# Patient Record
Sex: MALE | Race: BLACK OR AFRICAN AMERICAN | NOT HISPANIC OR LATINO
[De-identification: names, ages, dates, MRNs, and addresses within clinical notes are randomized per-mention and may not be internally consistent; named-entity substitution may affect disease eponyms.]

---

## 2017-03-02 ENCOUNTER — RESULT REVIEW (OUTPATIENT)
Age: 82
End: 2017-03-02

## 2017-03-09 ENCOUNTER — OUTPATIENT (OUTPATIENT)
Dept: OUTPATIENT SERVICES | Facility: HOSPITAL | Age: 82
LOS: 1 days | Discharge: HOME | End: 2017-03-09

## 2017-06-27 DIAGNOSIS — C91.10 CHRONIC LYMPHOCYTIC LEUKEMIA OF B-CELL TYPE NOT HAVING ACHIEVED REMISSION: ICD-10-CM

## 2017-06-27 DIAGNOSIS — I10 ESSENTIAL (PRIMARY) HYPERTENSION: ICD-10-CM

## 2021-02-21 ENCOUNTER — OUTPATIENT (OUTPATIENT)
Dept: OUTPATIENT SERVICES | Facility: HOSPITAL | Age: 86
LOS: 1 days | Discharge: HOME | End: 2021-02-21

## 2021-02-21 DIAGNOSIS — Z11.59 ENCOUNTER FOR SCREENING FOR OTHER VIRAL DISEASES: ICD-10-CM

## 2021-02-24 ENCOUNTER — OUTPATIENT (OUTPATIENT)
Dept: OUTPATIENT SERVICES | Facility: HOSPITAL | Age: 86
LOS: 1 days | Discharge: HOME | End: 2021-02-24

## 2021-02-24 VITALS
RESPIRATION RATE: 17 BRPM | TEMPERATURE: 98 F | WEIGHT: 149.91 LBS | SYSTOLIC BLOOD PRESSURE: 117 MMHG | OXYGEN SATURATION: 98 % | DIASTOLIC BLOOD PRESSURE: 60 MMHG | HEART RATE: 83 BPM | HEIGHT: 73 IN

## 2021-02-24 VITALS — DIASTOLIC BLOOD PRESSURE: 65 MMHG | SYSTOLIC BLOOD PRESSURE: 122 MMHG | HEART RATE: 80 BPM

## 2021-02-24 DIAGNOSIS — Z98.890 OTHER SPECIFIED POSTPROCEDURAL STATES: Chronic | ICD-10-CM

## 2021-02-24 NOTE — ASU PATIENT PROFILE, ADULT - PSH
History of surgery  RIGHT CHEST WALL PORT PLACEMENT  History of surgery  BILATERAL KNEE REPLACEMENT

## 2021-02-24 NOTE — PACU DISCHARGE NOTE - COMMENTS
Patient may progress directly to Phase II, may bypass Phase I  /58 HR 60 RR 13 temp 36.5C, o2 sat 100% on room air

## 2021-02-24 NOTE — PRE-ANESTHESIA EVALUATION ADULT - NSANTHOSAYNRD_GEN_A_CORE
No. ROSALINDA screening performed.  STOP BANG Legend: 0-2 = LOW Risk; 3-4 = INTERMEDIATE Risk; 5-8 = HIGH Risk

## 2021-02-27 DIAGNOSIS — F03.90 UNSPECIFIED DEMENTIA, UNSPECIFIED SEVERITY, WITHOUT BEHAVIORAL DISTURBANCE, PSYCHOTIC DISTURBANCE, MOOD DISTURBANCE, AND ANXIETY: ICD-10-CM

## 2021-02-27 DIAGNOSIS — C91.10 CHRONIC LYMPHOCYTIC LEUKEMIA OF B-CELL TYPE NOT HAVING ACHIEVED REMISSION: ICD-10-CM

## 2021-02-27 DIAGNOSIS — H25.12 AGE-RELATED NUCLEAR CATARACT, LEFT EYE: ICD-10-CM

## 2021-02-27 DIAGNOSIS — I10 ESSENTIAL (PRIMARY) HYPERTENSION: ICD-10-CM

## 2021-02-28 ENCOUNTER — OUTPATIENT (OUTPATIENT)
Dept: OUTPATIENT SERVICES | Facility: HOSPITAL | Age: 86
LOS: 1 days | Discharge: HOME | End: 2021-02-28

## 2021-02-28 DIAGNOSIS — Z11.59 ENCOUNTER FOR SCREENING FOR OTHER VIRAL DISEASES: ICD-10-CM

## 2021-02-28 DIAGNOSIS — Z98.890 OTHER SPECIFIED POSTPROCEDURAL STATES: Chronic | ICD-10-CM

## 2021-02-28 PROBLEM — F03.90 UNSPECIFIED DEMENTIA WITHOUT BEHAVIORAL DISTURBANCE: Chronic | Status: ACTIVE | Noted: 2021-02-24

## 2021-02-28 PROBLEM — I10 ESSENTIAL (PRIMARY) HYPERTENSION: Chronic | Status: ACTIVE | Noted: 2021-02-24

## 2021-02-28 PROBLEM — C91.10 CHRONIC LYMPHOCYTIC LEUKEMIA OF B-CELL TYPE NOT HAVING ACHIEVED REMISSION: Chronic | Status: ACTIVE | Noted: 2021-02-24

## 2021-03-02 NOTE — ASU PATIENT PROFILE, ADULT - PSH
History of surgery  LEFT EYE CATARACT EXTRRACTION WITH LENS IMPLANT  History of surgery  RIGHT CHEST WALL PORT PLACEMENT  History of surgery  BILATERAL KNEE REPLACEMENT

## 2021-03-03 ENCOUNTER — OUTPATIENT (OUTPATIENT)
Dept: OUTPATIENT SERVICES | Facility: HOSPITAL | Age: 86
LOS: 1 days | Discharge: HOME | End: 2021-03-03

## 2021-03-03 VITALS
HEIGHT: 72 IN | WEIGHT: 149.03 LBS | SYSTOLIC BLOOD PRESSURE: 139 MMHG | DIASTOLIC BLOOD PRESSURE: 62 MMHG | TEMPERATURE: 97 F | HEART RATE: 54 BPM | OXYGEN SATURATION: 96 % | RESPIRATION RATE: 17 BRPM

## 2021-03-03 VITALS
HEART RATE: 54 BPM | SYSTOLIC BLOOD PRESSURE: 128 MMHG | DIASTOLIC BLOOD PRESSURE: 66 MMHG | OXYGEN SATURATION: 99 % | RESPIRATION RATE: 16 BRPM

## 2021-03-03 DIAGNOSIS — Z98.890 OTHER SPECIFIED POSTPROCEDURAL STATES: Chronic | ICD-10-CM

## 2021-03-03 NOTE — CHART NOTE - NSCHARTNOTEFT_GEN_A_CORE
PACU ANESTHESIA ADMISSION NOTE        ____  Intubated  TV:______       Rate: ______      FiO2: ______    __x__  Patent Airway    __x__  Full return of protective reflexes    __x__  Full recovery from anesthesia / back to baseline status    Vitals:  T(C): 36.3 (03-03-21 @ 10:38), Max: 36.3 (03-03-21 @ 09:34)  HR: 54 (03-03-21 @ 10:38) (54 - 54)  BP: 139/62 (03-03-21 @ 10:38) (139/62 - 139/62)  RR: 17 (03-03-21 @ 10:38) (17 - 17)  SpO2: 96% (03-03-21 @ 10:38) (96% - 96%)    Mental Status:  __x__ Awake   ___x__ Alert   _____ Drowsy   _____ Sedated    Nausea/Vomiting:  __x__ NO  ______Yes,   See Post - Op Orders          Pain Scale (0-10):  _0____    Treatment: ____ None    __x__ See Post - Op/PCA Orders    Post - Operative Fluids:   ____ Oral   __x__ See Post - Op Orders    Plan: Discharge:   x___Home       _____Floor     _____Critical Care    _____  Other:_________________    Comments: Patient had smooth intraoperative event, no anesthesia complication.  PACU Vital signs: HR: 57          BP: 125       /  64        RR:  16           O2 Sat:    100   %     Temp 36

## 2021-03-08 DIAGNOSIS — H25.11 AGE-RELATED NUCLEAR CATARACT, RIGHT EYE: ICD-10-CM

## 2021-03-08 DIAGNOSIS — I10 ESSENTIAL (PRIMARY) HYPERTENSION: ICD-10-CM

## 2021-11-18 ENCOUNTER — APPOINTMENT (OUTPATIENT)
Dept: NEUROLOGY | Facility: CLINIC | Age: 86
End: 2021-11-18
Payer: MEDICARE

## 2021-11-18 VITALS
HEIGHT: 72 IN | WEIGHT: 148 LBS | TEMPERATURE: 97 F | SYSTOLIC BLOOD PRESSURE: 135 MMHG | DIASTOLIC BLOOD PRESSURE: 79 MMHG | BODY MASS INDEX: 20.05 KG/M2 | OXYGEN SATURATION: 98 % | HEART RATE: 96 BPM

## 2021-11-18 DIAGNOSIS — Z78.9 OTHER SPECIFIED HEALTH STATUS: ICD-10-CM

## 2021-11-18 DIAGNOSIS — Z86.79 PERSONAL HISTORY OF OTHER DISEASES OF THE CIRCULATORY SYSTEM: ICD-10-CM

## 2021-11-18 DIAGNOSIS — I61.9 NONTRAUMATIC INTRACEREBRAL HEMORRHAGE, UNSPECIFIED: ICD-10-CM

## 2021-11-18 PROCEDURE — 99203 OFFICE O/P NEW LOW 30 MIN: CPT

## 2021-11-18 NOTE — REVIEW OF SYSTEMS
[Sleep Disturbances] : sleep disturbances [Change In Personality] : personality change [Emotional Problems] : emotional problems [As Noted in HPI] : as noted in HPI [Negative] : Heme/Lymph

## 2021-11-18 NOTE — DISCUSSION/SUMMARY
[FreeTextEntry1] : Mr. Iniguez is a 88yo man with history of ICH 5 years ago with behavioral changes for the last year which are strangely using feces to disrupt him and his daughters home.  He appears confused today and daughter believes he is intentionally using feces as a weapon and acting crazy this visit.  Because of his prior ICH and abnormal EEG would work him up for possible new onset seizures or new injury to the brain\par 1. CTH\par 2. Video EEG\par 3. If both donot show any findings to explain these symptoms would refer to psychiatry\par 4. Asked daughter to record videos of events\par

## 2021-11-18 NOTE — PHYSICAL EXAM
[FreeTextEntry1] : Alert oriented to person, not place, not time.\par Not following 1 step commands sometimes \par NOt able to give name of President and goes off on tangents about the War he was in and being shot\par No facial, BTT b/l, PERRL\par No drift but poorly cooperative with motor exam\par Sensory not reliable\par Gait appears normal

## 2021-11-18 NOTE — HISTORY OF PRESENT ILLNESS
[FreeTextEntry1] : Mr. 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  He has not been on any medications since the last time I saw him and over the last year he has had some dramatic changes in his behavior.  He has been smearing feces throughout the house.  The daughter believes he is doing this intentionally.  Sometimes she will come home and find him naked with feces trailing through the house and on her clothes.  She says she has even found feces in a bowl in the Refrigerator.  She says he eats a significant amount of food and she says this is him refilling his ammunition for making more feces.  She says usually his hands and clothes are clean and she doesn’t understand how the feces will be smeared on the floor in a trail without him actually getting feces on himself.\par When asking the patient about these events he says "I don’t know" and he starts talking about being in war and saying things which donot make sense.

## 2021-12-05 ENCOUNTER — OUTPATIENT (OUTPATIENT)
Dept: OUTPATIENT SERVICES | Facility: HOSPITAL | Age: 86
LOS: 1 days | Discharge: HOME | End: 2021-12-05
Payer: MEDICARE

## 2021-12-05 DIAGNOSIS — I61.9 NONTRAUMATIC INTRACEREBRAL HEMORRHAGE, UNSPECIFIED: ICD-10-CM

## 2021-12-05 DIAGNOSIS — Z98.890 OTHER SPECIFIED POSTPROCEDURAL STATES: Chronic | ICD-10-CM

## 2021-12-05 PROCEDURE — 70450 CT HEAD/BRAIN W/O DYE: CPT | Mod: 26

## 2021-12-06 ENCOUNTER — INPATIENT (INPATIENT)
Facility: HOSPITAL | Age: 86
LOS: 1 days | Discharge: HOME | End: 2021-12-08
Attending: HOSPITALIST | Admitting: HOSPITALIST
Payer: MEDICARE

## 2021-12-06 VITALS
HEIGHT: 68 IN | RESPIRATION RATE: 18 BRPM | TEMPERATURE: 97 F | SYSTOLIC BLOOD PRESSURE: 97 MMHG | WEIGHT: 134.48 LBS | DIASTOLIC BLOOD PRESSURE: 54 MMHG | HEART RATE: 59 BPM

## 2021-12-06 DIAGNOSIS — C91.11 CHRONIC LYMPHOCYTIC LEUKEMIA OF B-CELL TYPE IN REMISSION: ICD-10-CM

## 2021-12-06 DIAGNOSIS — Z98.890 OTHER SPECIFIED POSTPROCEDURAL STATES: Chronic | ICD-10-CM

## 2021-12-06 DIAGNOSIS — F03.90 UNSPECIFIED DEMENTIA, UNSPECIFIED SEVERITY, WITHOUT BEHAVIORAL DISTURBANCE, PSYCHOTIC DISTURBANCE, MOOD DISTURBANCE, AND ANXIETY: ICD-10-CM

## 2021-12-06 DIAGNOSIS — G93.40 ENCEPHALOPATHY, UNSPECIFIED: ICD-10-CM

## 2021-12-06 DIAGNOSIS — G40.909 EPILEPSY, UNSPECIFIED, NOT INTRACTABLE, WITHOUT STATUS EPILEPTICUS: ICD-10-CM

## 2021-12-06 DIAGNOSIS — R29.6 REPEATED FALLS: ICD-10-CM

## 2021-12-06 DIAGNOSIS — Z96.653 PRESENCE OF ARTIFICIAL KNEE JOINT, BILATERAL: ICD-10-CM

## 2021-12-06 DIAGNOSIS — Z92.21 PERSONAL HISTORY OF ANTINEOPLASTIC CHEMOTHERAPY: ICD-10-CM

## 2021-12-06 DIAGNOSIS — Z90.49 ACQUIRED ABSENCE OF OTHER SPECIFIED PARTS OF DIGESTIVE TRACT: ICD-10-CM

## 2021-12-06 DIAGNOSIS — I10 ESSENTIAL (PRIMARY) HYPERTENSION: ICD-10-CM

## 2021-12-06 DIAGNOSIS — Z85.46 PERSONAL HISTORY OF MALIGNANT NEOPLASM OF PROSTATE: ICD-10-CM

## 2021-12-06 LAB
ALBUMIN SERPL ELPH-MCNC: 3 G/DL — LOW (ref 3.5–5.2)
ALP SERPL-CCNC: 102 U/L — SIGNIFICANT CHANGE UP (ref 30–115)
ALT FLD-CCNC: 14 U/L — SIGNIFICANT CHANGE UP (ref 0–41)
AMMONIA BLD-MCNC: 15 UMOL/L — SIGNIFICANT CHANGE UP (ref 11–55)
ANION GAP SERPL CALC-SCNC: 11 MMOL/L — SIGNIFICANT CHANGE UP (ref 7–14)
AST SERPL-CCNC: 17 U/L — SIGNIFICANT CHANGE UP (ref 0–41)
BILIRUB SERPL-MCNC: 0.8 MG/DL — SIGNIFICANT CHANGE UP (ref 0.2–1.2)
BUN SERPL-MCNC: 13 MG/DL — SIGNIFICANT CHANGE UP (ref 10–20)
CALCIUM SERPL-MCNC: 8.6 MG/DL — SIGNIFICANT CHANGE UP (ref 8.5–10.1)
CHLORIDE SERPL-SCNC: 102 MMOL/L — SIGNIFICANT CHANGE UP (ref 98–110)
CO2 SERPL-SCNC: 28 MMOL/L — SIGNIFICANT CHANGE UP (ref 17–32)
CREAT SERPL-MCNC: 0.8 MG/DL — SIGNIFICANT CHANGE UP (ref 0.7–1.5)
GLUCOSE SERPL-MCNC: 86 MG/DL — SIGNIFICANT CHANGE UP (ref 70–99)
HCT VFR BLD CALC: 35.7 % — LOW (ref 42–52)
HGB BLD-MCNC: 12 G/DL — LOW (ref 14–18)
MAGNESIUM SERPL-MCNC: 2 MG/DL — SIGNIFICANT CHANGE UP (ref 1.8–2.4)
MCHC RBC-ENTMCNC: 33.6 G/DL — SIGNIFICANT CHANGE UP (ref 32–37)
MCHC RBC-ENTMCNC: 33.8 PG — HIGH (ref 27–31)
MCV RBC AUTO: 100.6 FL — HIGH (ref 80–94)
NRBC # BLD: 0 /100 WBCS — SIGNIFICANT CHANGE UP (ref 0–0)
PLATELET # BLD AUTO: 160 K/UL — SIGNIFICANT CHANGE UP (ref 130–400)
POTASSIUM SERPL-MCNC: 3.6 MMOL/L — SIGNIFICANT CHANGE UP (ref 3.5–5)
POTASSIUM SERPL-SCNC: 3.6 MMOL/L — SIGNIFICANT CHANGE UP (ref 3.5–5)
PROT SERPL-MCNC: 4.5 G/DL — LOW (ref 6–8)
RBC # BLD: 3.55 M/UL — LOW (ref 4.7–6.1)
RBC # FLD: 13.3 % — SIGNIFICANT CHANGE UP (ref 11.5–14.5)
SODIUM SERPL-SCNC: 141 MMOL/L — SIGNIFICANT CHANGE UP (ref 135–146)
TSH SERPL-MCNC: 3.14 UIU/ML — SIGNIFICANT CHANGE UP (ref 0.27–4.2)
WBC # BLD: 10.81 K/UL — HIGH (ref 4.8–10.8)
WBC # FLD AUTO: 10.81 K/UL — HIGH (ref 4.8–10.8)

## 2021-12-06 PROCEDURE — 71045 X-RAY EXAM CHEST 1 VIEW: CPT | Mod: 26

## 2021-12-06 PROCEDURE — 99223 1ST HOSP IP/OBS HIGH 75: CPT

## 2021-12-06 PROCEDURE — 99221 1ST HOSP IP/OBS SF/LOW 40: CPT

## 2021-12-06 PROCEDURE — 93970 EXTREMITY STUDY: CPT | Mod: 26

## 2021-12-06 RX ORDER — ENOXAPARIN SODIUM 100 MG/ML
40 INJECTION SUBCUTANEOUS DAILY
Refills: 0 | Status: DISCONTINUED | OUTPATIENT
Start: 2021-12-06 | End: 2021-12-08

## 2021-12-06 RX ORDER — CHLORHEXIDINE GLUCONATE 213 G/1000ML
1 SOLUTION TOPICAL
Refills: 0 | Status: DISCONTINUED | OUTPATIENT
Start: 2021-12-06 | End: 2021-12-08

## 2021-12-06 RX ORDER — AMLODIPINE BESYLATE 2.5 MG/1
1 TABLET ORAL
Qty: 0 | Refills: 0 | DISCHARGE

## 2021-12-06 NOTE — CONSULT NOTE ADULT - SUBJECTIVE AND OBJECTIVE BOX
10/27/21                            Itz ROD Wally  [address] [facility] [id number]    To Whom It May Concern:    This is to certify Itz ROD Lo was evaluated with Ashleigh Betancourt NP 06/21/21 for right ankle pain. He completed a course of physical therapy and has returned to all normal physical activities without any complications. He is clear to deploy.      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  He has not been on any medications since the last time I saw him and over the last year he has had some dramatic changes in his behavior.  He has been smearing feces throughout the house.  The daughter believes he is doing this intentionally.  Sometimes she will come home and find him naked with feces trailing through the house and on her clothes.  She says she has even found feces in a bowl in the Refrigerator.  She says he eats a significant amount of food and she says this is him refilling his ammunition for making more feces.  She says usually his hands and clothes are clean and she doesn’t understand how the feces will be smeared on the floor in a trail without him actually getting feces on himself.  When asking the patient about these events he says "I don’t know" and he starts talking about being in war and saying things which donot make sense.      Alert oriented to person, not place, not time.  Not following 1 step commands sometimes   NOt able to give name of President and goes off on tangents about the War he was in and being shot  No facial, BTT b/l, PERRL  No drift but poorly cooperative with motor exam  Sensory not reliable  Gait appears normal HPI: History obtained from wife. Outpatient records reviewed.  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 After the hemorrhage pt was on Keppra 750 mgs BID as a precaution, no known documented seizures. Later in the year in 2016 Dr Ricky ronquillo (pts primary neurologist)  discontinues Keppra due to ? Depression. Pt did not follow up with the neuro for 5 years.  In November 2021 pt went back to see Dr Ricky ronquillo due to some dramatic changes in his behavior.  He has been smearing feces throughout the house.  The wife believes he is doing this intentionally.  Sometimes she will come home and find him naked with feces trailing through the house and on her clothes.  She says she has even found feces in a bowl in the Refrigerator. Additionally, wife noted pt having frequent falls some intentional some note. All the falls are not associated with LOC and once pt falls he starts screaming and cursing. Wife denies any events suggestive of seizure.       PAST MEDICAL & SURGICAL HISTORY:  CLL (chronic lymphocytic leukemia)    Hypertension, unspecified type    Dementia    History of surgery  BILATERAL KNEE REPLACEMENT    History of surgery  RIGHT CHEST WALL PORT PLACEMENT    History of surgery  LEFT EYE CATARACT EXTRRACTION WITH LENS IMPLANT        Allergies    No Known Allergies    Intolerances    Social History:  Tobacco use:  denies   EtOH use: denies   Illicit drug use: denies (06 Dec 2021 10:32)    Risk Factors  + left parieto-occipital hemorrhage 2016      MEDICATIONS  (STANDING):  chlorhexidine 4% Liquid 1 Application(s) Topical <User Schedule>  enoxaparin Injectable 40 milliGRAM(s) SubCutaneous daily    MEDICATIONS  (STANDING):  chlorhexidine 4% Liquid 1 Application(s) Topical <User Schedule>  enoxaparin Injectable 40 milliGRAM(s) SubCutaneous daily    MEDICATIONS  (PRN):  LORazepam   Injectable 2 milliGRAM(s) IV Push once PRN seizure    ICU Vital Signs Last 24 Hrs  T(C): 35.6 (06 Dec 2021 14:46), Max: 36.3 (06 Dec 2021 10:20)  T(F): 96.1 (06 Dec 2021 14:46), Max: 97.3 (06 Dec 2021 10:20)  HR: 52 (06 Dec 2021 14:46) (52 - 59)  BP: 106/56 (06 Dec 2021 14:46) (97/54 - 106/56)  BP(mean): --  ABP: --  ABP(mean): --  RR: 16 (06 Dec 2021 14:46) (16 - 18)  SpO2: --        Neurologic Examination limited due to pts cognitive status    General:  Appearance is consistent with chronologic age.  No abnormal facies.   General: The patient is oriented to person only.  Following one step commands at times  Cranial nerves: EOMI w/o nystagmus. No ptosis/weakness of eyelid closure.  Face symmetric. Hearing grossly intact b/l.  Palate elevates midline.  Tongue midline.  Motor examination:   No drift. No tenderness, twitching, tremors or involuntary movements.  Reflexes:   2+ LLE, 2+ RLE  Cerebellum:   Gait deferred       Neuroimaging:    REEG 2016 - mild intermittent right hemispheric dysfunction

## 2021-12-06 NOTE — H&P ADULT - ATTENDING COMMENTS
Plan of care as discussed and documented above.  Pls f/u Head CT done at [facility].    Pls send the following w/up: Ammonia level / TSH / UA / CBC / CMP / TSH / B12/Folate / RPR / VEEG     Dx: Encephalopathy NOS - Suspected Seizure Activity Plan of care as discussed and documented above.  Pls f/u Head CT done at [facility]iology.    Pls send the following w/up: Ammonia level / TSH / UA / CBC / CMP / TSH / B12/Folate / RPR / VEEG / Duplex B/L Legs / CXR / TTE     Dx: Encephalopathy NOS - Suspected Seizure Activity / Leg Edema r/o DVT r/o CHF

## 2021-12-06 NOTE — CONSULT NOTE ADULT - ATTENDING COMMENTS
Agree with the Hx and the plan  He is here to R/O the possibility of seizure D/O in the context of episodic behavioral changes  will start the monitoring  seizure precaution

## 2021-12-06 NOTE — H&P ADULT - NSICDXPASTSURGICALHX_GEN_ALL_CORE_FT
PAST SURGICAL HISTORY:  History of surgery BILATERAL KNEE REPLACEMENT    History of surgery RIGHT CHEST WALL PORT PLACEMENT    History of surgery LEFT EYE CATARACT EXTRRACTION WITH LENS IMPLANT

## 2021-12-06 NOTE — H&P ADULT - ASSESSMENT
A 88 y/o male with pmhx of HTN not on meds, CLL right side port s/p treatment years agon in remission, stomach/bowel resection,  prostate CA , Intracerebral hemorrhage left parieto-occipital lobe 5 yrs ago , seen by neurology on  11/18 for behavioral personality changes  had CT 12/5/21 report pending sent by neuro for VEEG to rule out seizure.    #behavioral changes r/o seizure   #VEEG  -neurology consult  -cbc, cmp, mg, B12 , RPR, folate, TSH, UA, ammonia level  -follow up CT head report done 12/5 as outpt   -ativan prn for tonic clonic seizure lasting longer then 5 min    #HTN  -pt not taking any meds at home   -monitor BP    #Bilateral lower  extremity edema  -venous dupplex  -ECHO  -cxr      #CLL s/p chemo years ago in remission   -follow up with hem/onc as out    #Prostate Ca  -family reports opting treatment  -oupt follow up with urology     DVT prophylaxis- lovenox 40 daily   PT consult  A 88 y/o male with pmhx of HTN not on meds, CLL right side port s/p treatment years agon in remission, stomach/bowel resection,  prostate CA , Intracerebral hemorrhage left parieto-occipital lobe 5 yrs ago , seen by neurology on  11/18 for behavioral personality changes  had CT 12/5/21 report pending sent by neuro for VEEG to rule out seizure.    #behavioral changes r/o seizure   #VEEG  -neurology consult  -cbc, cmp, mg, B12 , RPR, folate, TSH, UA, ammonia level  -will follow up CT head report done 12/5 as outpt , called [facility] radiology, left voicemail  -ativan prn for tonic clonic seizure lasting longer then 5 min    #HTN  -pt not taking any meds at home   -monitor BP    #Bilateral lower  extremity edema  -venous dupplex  -ECHO  -cxr      #CLL s/p chemo years ago in remission   -follow up with hem/onc as out    #Prostate Ca  -family reports opting treatment  -oupt follow up with urology     DVT prophylaxis- lovenox 40 daily   PT consult

## 2021-12-06 NOTE — CONSULT NOTE ADULT - ASSESSMENT
Mr. Iniguez is a 89yo man with history of ICH 5 years ago with behavioral changes for the last year which are strangely using feces to disrupt him and his daughters home.  He appears confused today and daughter believes he is intentionally using feces as a weapon and acting crazy this visit.  Because of his prior ICH and abnormal EEG would work him up for possible new onset seizures or new injury to the brain    2. Video EEG  3. If both donot show any findings to explain these symptoms would refer to psychiatry  4. 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     Discussed with Dr. Herron    Plan:   - VEEG monitoring for risk assessment  - Seizure precautions  - no AEDs for now  - Ativan 2mg IV PRN for generalized tonic-clonic seizure lasting longer than 2 minutes, or two consecutive seizures without return to baseline in-between   - CBC, CMP, Mg  - Keep Mg above 2    Plan discussed with wife in details, all questions answered.

## 2021-12-06 NOTE — H&P ADULT - NSICDXPASTMEDICALHX_GEN_ALL_CORE_FT
PAST MEDICAL HISTORY:  CLL (chronic lymphocytic leukemia)     Dementia     Hypertension, unspecified type

## 2021-12-06 NOTE — PATIENT PROFILE ADULT - FALL HARM RISK - HARM RISK INTERVENTIONS
Assistance with ambulation/Assistance OOB with selected safe patient handling equipment/Communicate Risk of Fall with Harm to all staff/Discuss with provider need for PT consult/Monitor for mental status changes/Monitor gait and stability/Move patient closer to nurses' station/Reinforce activity limits and safety measures with patient and family/Reorient to person, place and time as needed/Tailored Fall Risk Interventions/Toileting schedule using arm’s reach rule for commode and bathroom/Use of alarms - bed, chair and/or voice tab/Visual Cue: Yellow wristband and red socks/Bed in lowest position, wheels locked, appropriate side rails in place/Call bell, personal items and telephone in reach/Instruct patient to call for assistance before getting out of bed or chair/Non-slip footwear when patient is out of bed/[place] to call system/Physically safe environment - no spills, clutter or unnecessary equipment/Purposeful Proactive Rounding/Room/bathroom lighting operational, light cord in reach

## 2021-12-06 NOTE — H&P ADULT - NSHPPHYSICALEXAM_GEN_ALL_CORE
VITALS:     ICU Vital Signs Last 24 Hrs    GENERAL: NAD, lying in bed comfortably  HEAD:  right side forehead old abrasion, no tenderness, no bump,   EYES: EOMI, PERRLA, conjunctiva and sclera clear, no horizontal nystagmus   ENT: Moist mucous membranes  NECK: Supple, No JVD, no c-spine tenderness, range of motion intact.   CHEST/LUNG: Clear to auscultation bilaterally; No rales, rhonchi, wheezing, or rubs. Unlabored respirations, right side chest port, no sign of infection.   HEART: Regular rate and rhythm; No murmurs, rubs, or gallops  ABDOMEN: Soft, Nontender, Nondistended. No hepatomegally  EXTREMITIES:  bilateral lower extremity edema from mid shin to feet, Right LE >LE   NERVOUS SYSTEM:  Pt oriented to person only,   MSK: FROM all 4 extremities, full and equal strength

## 2021-12-07 LAB
ALBUMIN SERPL ELPH-MCNC: 3.4 G/DL — LOW (ref 3.5–5.2)
ALP SERPL-CCNC: 116 U/L — HIGH (ref 30–115)
ALT FLD-CCNC: 16 U/L — SIGNIFICANT CHANGE UP (ref 0–41)
ANION GAP SERPL CALC-SCNC: 11 MMOL/L — SIGNIFICANT CHANGE UP (ref 7–14)
AST SERPL-CCNC: 21 U/L — SIGNIFICANT CHANGE UP (ref 0–41)
BILIRUB SERPL-MCNC: 0.8 MG/DL — SIGNIFICANT CHANGE UP (ref 0.2–1.2)
BUN SERPL-MCNC: 15 MG/DL — SIGNIFICANT CHANGE UP (ref 10–20)
CALCIUM SERPL-MCNC: 8.8 MG/DL — SIGNIFICANT CHANGE UP (ref 8.5–10.1)
CHLORIDE SERPL-SCNC: 103 MMOL/L — SIGNIFICANT CHANGE UP (ref 98–110)
CO2 SERPL-SCNC: 27 MMOL/L — SIGNIFICANT CHANGE UP (ref 17–32)
CREAT SERPL-MCNC: 0.7 MG/DL — SIGNIFICANT CHANGE UP (ref 0.7–1.5)
FOLATE SERPL-MCNC: 9.9 NG/ML — SIGNIFICANT CHANGE UP
GLUCOSE SERPL-MCNC: 152 MG/DL — HIGH (ref 70–99)
HCT VFR BLD CALC: 39.2 % — LOW (ref 42–52)
HGB BLD-MCNC: 13.2 G/DL — LOW (ref 14–18)
MAGNESIUM SERPL-MCNC: 2 MG/DL — SIGNIFICANT CHANGE UP (ref 1.8–2.4)
MCHC RBC-ENTMCNC: 33.7 G/DL — SIGNIFICANT CHANGE UP (ref 32–37)
MCHC RBC-ENTMCNC: 34.2 PG — HIGH (ref 27–31)
MCV RBC AUTO: 101.6 FL — HIGH (ref 80–94)
NRBC # BLD: 0 /100 WBCS — SIGNIFICANT CHANGE UP (ref 0–0)
PLATELET # BLD AUTO: 189 K/UL — SIGNIFICANT CHANGE UP (ref 130–400)
POTASSIUM SERPL-MCNC: 4 MMOL/L — SIGNIFICANT CHANGE UP (ref 3.5–5)
POTASSIUM SERPL-SCNC: 4 MMOL/L — SIGNIFICANT CHANGE UP (ref 3.5–5)
PROT SERPL-MCNC: 5.1 G/DL — LOW (ref 6–8)
RBC # BLD: 3.86 M/UL — LOW (ref 4.7–6.1)
RBC # FLD: 13.2 % — SIGNIFICANT CHANGE UP (ref 11.5–14.5)
SODIUM SERPL-SCNC: 141 MMOL/L — SIGNIFICANT CHANGE UP (ref 135–146)
T PALLIDUM AB TITR SER: NEGATIVE — SIGNIFICANT CHANGE UP
VIT B12 SERPL-MCNC: 169 PG/ML — LOW (ref 232–1245)
WBC # BLD: 14.12 K/UL — HIGH (ref 4.8–10.8)
WBC # FLD AUTO: 14.12 K/UL — HIGH (ref 4.8–10.8)

## 2021-12-07 PROCEDURE — 99231 SBSQ HOSP IP/OBS SF/LOW 25: CPT

## 2021-12-07 PROCEDURE — 95720 EEG PHY/QHP EA INCR W/VEEG: CPT

## 2021-12-07 PROCEDURE — 99233 SBSQ HOSP IP/OBS HIGH 50: CPT

## 2021-12-07 RX ORDER — DIVALPROEX SODIUM 500 MG/1
250 TABLET, DELAYED RELEASE ORAL ONCE
Refills: 0 | Status: COMPLETED | OUTPATIENT
Start: 2021-12-07 | End: 2021-12-07

## 2021-12-07 RX ORDER — DIVALPROEX SODIUM 500 MG/1
250 TABLET, DELAYED RELEASE ORAL EVERY 12 HOURS
Refills: 0 | Status: DISCONTINUED | OUTPATIENT
Start: 2021-12-07 | End: 2021-12-08

## 2021-12-07 RX ORDER — DIVALPROEX SODIUM 500 MG/1
250 TABLET, DELAYED RELEASE ORAL EVERY 12 HOURS
Refills: 0 | Status: DISCONTINUED | OUTPATIENT
Start: 2021-12-07 | End: 2021-12-07

## 2021-12-07 RX ORDER — PREGABALIN 225 MG/1
1000 CAPSULE ORAL DAILY
Refills: 0 | Status: DISCONTINUED | OUTPATIENT
Start: 2021-12-07 | End: 2021-12-08

## 2021-12-07 RX ADMIN — ENOXAPARIN SODIUM 40 MILLIGRAM(S): 100 INJECTION SUBCUTANEOUS at 12:41

## 2021-12-07 RX ADMIN — PREGABALIN 1000 MICROGRAM(S): 225 CAPSULE ORAL at 12:42

## 2021-12-07 RX ADMIN — DIVALPROEX SODIUM 250 MILLIGRAM(S): 500 TABLET, DELAYED RELEASE ORAL at 13:35

## 2021-12-07 RX ADMIN — CHLORHEXIDINE GLUCONATE 1 APPLICATION(S): 213 SOLUTION TOPICAL at 05:52

## 2021-12-07 RX ADMIN — DIVALPROEX SODIUM 250 MILLIGRAM(S): 500 TABLET, DELAYED RELEASE ORAL at 21:52

## 2021-12-07 NOTE — PROGRESS NOTE ADULT - SUBJECTIVE AND OBJECTIVE BOX
Epilepsy Attending Note:     KO MEJÍA    89y Male  MRN [id number]    Vital Signs Last 24 Hrs  T(C): 35.6 (07 Dec 2021 04:52), Max: 36.3 (06 Dec 2021 21:00)  T(F): 96 (07 Dec 2021 04:52), Max: 97.4 (06 Dec 2021 21:00)  HR: 58 (07 Dec 2021 04:52) (52 - 75)  BP: 112/56 (07 Dec 2021 04:52) (106/56 - 112/56)  BP(mean): --  RR: 16 (07 Dec 2021 04:52) (16 - 16)  SpO2: --                          13.2   14.12 )-----------( 189      ( 07 Dec 2021 08:17 )             39.2       12-07    141  |  103  |  15  ----------------------------<  152<H>  4.0   |  27  |  0.7    Ca    8.8      07 Dec 2021 08:17  Mg     2.0     12-07    TPro  5.1<L>  /  Alb  3.4<L>  /  TBili  0.8  /  DBili  x   /  AST  21  /  ALT  16  /  AlkPhos  116<H>  12-07      MEDICATIONS  (STANDING):  chlorhexidine 4% Liquid 1 Application(s) Topical <User Schedule>  cyanocobalamin Injectable 1000 MICROGram(s) IntraMuscular daily  diVALproex  milliGRAM(s) Oral every 12 hours  enoxaparin Injectable 40 milliGRAM(s) SubCutaneous daily    MEDICATIONS  (PRN):  LORazepam   Injectable 2 milliGRAM(s) IV Push two times a day PRN generalized tonic-clonic seizure lasting longer than 2 minutes            VEEG in the last 24 hours:    Background----------- continues, slightly less than optimally organized BG reaching 7-8 hz superimposed by small amount of lower range theta    Focal and generalized slowing-------------  1- mild to moderate generalized slowing. 2- left hemispheric mainly posterior temporal focal slowing    Interictal activity-------------  left posterior sharp transients and triphasic sharp waves that could not be clearly characterized as epileptiform in nature    Events--------brief episodes of agitation that were not epileptic in nature    Seizures-----------  none    Impression:  abnormal as above    Plan - discussed with the patient . will continue the monitoring. start Depakote 250 bid. seizure precaution

## 2021-12-07 NOTE — PHYSICAL THERAPY INITIAL EVALUATION ADULT - GENERAL OBSERVATIONS, REHAB EVAL
Attempted to see pt for PT evaluation but pt currently being set up for video EEG. PT will cont to follow up.
9:55-10:30 Chart reviewed. Patient available to be seen for physical therapy, denies pain, confirmed with RN.  Pt presents in bed +VEEG in NAD.

## 2021-12-07 NOTE — CHART NOTE - NSCHARTNOTEFT_GEN_A_CORE
Epilepsy NP:    Current EEG findings and treatment plan discussed with patient's wife Moriah over the phone. All questions answered.

## 2021-12-07 NOTE — PROGRESS NOTE ADULT - SUBJECTIVE AND OBJECTIVE BOX
Patient is a 89y old  Male who presents with suspected seizure, on VEEG now. Pt is underweight, deconditioned, weak, has no specific complaints.   Today pt is comfortable, has no specific complaints.     Vital Signs Last 24 Hrs  T(C): 36.1 (07 Dec 2021 13:05), Max: 36.3 (06 Dec 2021 21:00)  T(F): 97 (07 Dec 2021 13:05), Max: 97.4 (06 Dec 2021 21:00)  HR: 86 (07 Dec 2021 13:05) (58 - 86)  BP: 148/97 (07 Dec 2021 13:05) (107/57 - 148/97)  BP(mean): --  RR: 16 (07 Dec 2021 13:05) (16 - 16)    PHYSICAL EXAM:  GENERAL: NAD, well-groomed, well-developed  HEAD:  Atraumatic, Normocephalic  NECK: Supple, No JVD, Normal thyroid  NERVOUS SYSTEM:  awake, answering questions, demented, moving all extremities   CHEST/LUNG: decreased BS at bases   HEART: Regular rate and rhythm; No murmurs, rubs, or gallops  ABDOMEN: Soft, Nontender, Nondistended; Bowel sounds present  EXTREMITIES:  2+ Peripheral Pulses, No clubbing, cyanosis, or edema, muscle waisting noted on LE   LYMPH: No lymphadenopathy noted  SKIN: No rashes or lesions      LABS:                        13.2   14.12 )-----------( 189      ( 07 Dec 2021 08:17 )             39.2     12-07    141  |  103  |  15  ----------------------------<  152<H>  4.0   |  27  |  0.7    Ca    8.8      07 Dec 2021 08:17  Mg     2.0     12-07    TPro  5.1<L>  /  Alb  3.4<L>  /  TBili  0.8  /  DBili  x   /  AST  21  /  ALT  16  /  AlkPhos  116<H>  12-07    RADIOLOGY & ADDITIONAL TESTS:    < from: VA Duplex Lower Ext Vein Scan, Bilat (12.06.21 @ 13:02) >  Impression:    No evidence of deep venous thrombosis or superficial thrombophlebitis in the bilateral lower extremities.    Right peroneal vein not visualized    < end of copied text >  < from: Xray Chest 1 View-PORTABLE IMMEDIATE (Xray Chest 1 View-PORTABLE IMMEDIATE .) (12.06.21 @ 11:47) >  IMPRESSION:    Right IJ Mediport, unchanged. No focal consolidation, pneumothorax or pleural effusion.    Stable cardiomediastinal silhouette. Unchanged osseous structures.    < end of copied text >  < from: CT Head No Cont (12.05.21 @ 13:50) >    IMPRESSION:    No acute territorial infarct, intracranial hemorrhage, or midline shift.    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    Stable moderate chronic microvascular ischemic changes.    < end of copied text >    MEDICATIONS  (STANDING):  chlorhexidine 4% Liquid 1 Application(s) Topical <User Schedule>  cyanocobalamin Injectable 1000 MICROGram(s) IntraMuscular daily  diVALproex Sprinkle 250 milliGRAM(s) Oral every 12 hours  enoxaparin Injectable 40 milliGRAM(s) SubCutaneous daily    MEDICATIONS  (PRN):  LORazepam   Injectable 2 milliGRAM(s) IV Push two times a day PRN generalized tonic-clonic seizure lasting longer than 2 minutes

## 2021-12-07 NOTE — PHYSICAL THERAPY INITIAL EVALUATION ADULT - NAME OF CLINICIAN
ED General Adult HPI





- General


Chief complaint: Psych


Stated complaint: no quiergiancarlo vivir


Time Seen by Provider: 08/24/19 21:16


Source: patient, EMS (verbal report received from EMS.ems notes not available at

time of  chart dictation), RN notes reviewed


Mode of arrival: Stretcher


Limitations: No Limitations





- History of Present Illness


Initial comments: 





This is a 21-year-old female.  The patient is brought to the hospital by EMS for

reported suicide attempt.  The patient to me states in Taiwanese that she wants to

die.  She denies physical pain.  As per EMS, the patient drank an unknown 

quantity of bleach, ammonia and had an episode of unresponsivenes

s/unconsciousness.  Apparently, she fell outside 





In the field, the patient was awake and alert as per EMS, and was speaking in 

full sentences.  The patient denies physical pain.  The patient would not tell 

me what she drank.  The patient will not tell me how much she drank.  The 

patient states she is not having physical pain.  The patient states she wants to

die








The patient will not describe exacerbating or relieving factors.  The patient 

follows commands.


-: Sudden, This evening


Quality: other


Consistency: other


Improves with: other


Worsens with: other


Associated Symptoms: other





- Related Data


                                Home Medications











 Medication  Instructions  Recorded  Confirmed  Last Taken


 


No Known Home Medications [No  08/24/19 08/24/19 Unknown





Reported Home Medications]    











                                    Allergies











Allergy/AdvReac Type Severity Reaction Status Date / Time


 


No Known Allergies Allergy   Unverified 08/24/19 22:46














ED Review of Systems


ROS: 


Stated complaint: UNRESPONSIVE


Other details as noted in HPI





Comment: patient not forthcoming


Cardiovascular: denies: chest pain


Gastrointestinal: denies: abdominal pain





ED Past Medical Hx





- Medications


Home Medications: 


                                Home Medications











 Medication  Instructions  Recorded  Confirmed  Last Taken  Type


 


No Known Home Medications [No  08/24/19 08/24/19 Unknown History





Reported Home Medications]     














ED Physical Exam





- General


Limitations: Language Barrier


General appearance: alert, anxious





- Head


Head exam: Present: atraumatic, normocephalic





- Eye


Eye exam: Present: normal appearance, EOMI





- ENT


ENT exam: Present: normal orophraynx, mucous membranes moist, normal external 

ear exam, other (patient speaking in full sentences.  There is no stridor.  

There is no dysphonia.  The patient will not open up her mouth to allow me to 

see the posterior pharynx.  The visualized portion of the tongue is within 

normal limits.  There is no abrasion noted to the right lateral aspect of the 

lower lip)





- Neck


Neck exam: Present: normal inspection, full ROM.  Absent: tenderness, 

meningismus





- Respiratory


Respiratory exam: Present: normal lung sounds bilaterally.  Absent: respiratory 

distress





- Cardiovascular


Cardiovascular Exam: Present: normal rhythm, tachycardia, normal heart sounds.  

Absent: systolic murmur, diastolic murmur, rubs, gallop





- GI/Abdominal


GI/Abdominal exam: Present: soft.  Absent: distended, tenderness, guarding, 

rebound, rigid, pulsatile mass





- Extremities Exam


Extremities exam: Present: normal inspection, full ROM, other (2+ pulses noted 

in the bilateral upper, lower extremities.  Compartments soft.  No long bony 

tenderness.  The pelvis is stable.).  Absent: pedal edema, joint swelling, calf 

tenderness





- Back Exam


Back exam: Present: normal inspection, full ROM.  Absent: tenderness, CVA 

tenderness (R), CVA tenderness (L), paraspinal tenderness, vertebral tenderness





- Neurological Exam


Neurological exam: Present: alert, other (there is no facial droop.  The tongue 

is midline.  The extraocular movements are intact bilaterally.  Patient speaking

in complete sentences.  There is no stridor.  There is no dysphonia.  There is 

5/5 strength right arm, right leg, left arm, left leg sensation is intact to 

light touch bilateral upper, lower extremities.)





- Psychiatric


Psychiatric exam: Present: anxious





- Skin


Skin exam: Present: warm, dry, intact, normal color.  Absent: rash





ED Course


                                   Vital Signs











  08/24/19 08/24/19 08/24/19





  21:20 21:21 21:31


 


Temperature 98.6 F  


 


Pulse Rate 117 H 122 H 


 


Respiratory 20 15 9 L





Rate   


 


Blood Pressure 113/73  106/72


 


O2 Sat by Pulse 99 97 96





Oximetry   














  08/24/19 08/24/19 08/24/19





  21:45 22:00 22:17


 


Temperature   


 


Pulse Rate 113 H 106 H 111 H


 


Respiratory 16 18 13





Rate   


 


Blood Pressure 112/70 106/64 106/64


 


O2 Sat by Pulse 99 100 98





Oximetry   














  08/24/19 08/24/19 08/24/19





  22:30 22:45 23:00


 


Temperature   


 


Pulse Rate 109 H 114 H 109 H


 


Respiratory 19 18 17





Rate   


 


Blood Pressure 108/64 111/67 108/63


 


O2 Sat by Pulse 98 98 98





Oximetry   














  08/24/19 08/24/19 08/24/19





  23:15 23:30 23:45


 


Temperature   


 


Pulse Rate 111 H 111 H 108 H


 


Respiratory 14 19 13





Rate   


 


Blood Pressure 118/57 108/65 102/61


 


O2 Sat by Pulse 96 98 97





Oximetry   














  08/25/19 08/25/19 08/25/19





  00:00 00:15 00:30


 


Temperature   


 


Pulse Rate  97 H 96 H


 


Respiratory  17 17





Rate   


 


Blood Pressure 97/58 107/61 113/57


 


O2 Sat by Pulse  98 98





Oximetry   














  08/25/19





  00:41


 


Temperature 


 


Pulse Rate 98 H


 


Respiratory 16





Rate 


 


Blood Pressure 113/57


 


O2 Sat by Pulse 98





Oximetry 














- Reevaluation(s)


Reevaluation #1: 





08/24/19 21:56


Differential diagnosis, including not limited to: Caustic ingestion, 

suicidality, medical clearance





Assessment and plan: 21-year-old female, nonfocal motor exam, awake, alert, 

follows commands, with reported complaint of overdose on bleach, and unknown 

time.








[facility] was contacted by nursing team, please see their 

documentation.








Patient placed on a 1013.  Psychiatric consultation requested.  Noncontrast CT 

scan of the brain is requested.  X-ray of the chest, screening laboratory 

studies requested.  Patient will require admission for airway observation, and 

gastroenterology consultation.  Discussed with GI on-call, Dr. Alvarez, who is rachel

nable to seeing the patient tomorrow in consultation.








Doubt intracranial injury, however, we will obtain CT scan of the brain.


Reevaluation #2: 





08/24/19 23:00


Dr ALLY Weinstein accepts to the hospital service





Noncontrast CT scan of the brain is negative.  Laboratory studies reviewed and 

appreciated.  There is no midline cervical spine pain or tenderness.  The 

patient is found to have an elevated blood alcohol level.  X-ray of the cervical

spine ordered; I think it is very unlikely that the patient has a cervical spine

injury at this time.





ED Medical Decision Making





- Lab Data


Result diagrams: 


                                 08/25/19 04:40





                                 08/25/19 04:40








                                   Lab Results











  08/24/19 08/24/19 Range/Units





  21:25 21:25 


 


WBC  7.6  7.6  (4.5-11.0)  K/mm3


 


RBC  4.72  4.71  (3.65-5.03)  M/mm3


 


Hgb  13.8  13.8  (10.1-14.3)  gm/dl


 


Hct  39.9  40.2  (30.3-42.9)  %


 


MCV  85  85  (79-97)  fl


 


MCH  29  29  (28-32)  pg


 


MCHC  35 H  34  (30-34)  %


 


RDW  13.2  13.2  (13.2-15.2)  %


 


Plt Count  395  396  (140-440)  K/mm3











                                   Vital Signs











  08/24/19 08/24/19 08/24/19





  21:20 21:21 21:31


 


Temperature 98.6 F  


 


Pulse Rate 117 H 122 H 


 


Respiratory 20 15 9 L





Rate   


 


Blood Pressure 113/73  106/72


 


O2 Sat by Pulse 99 97 96





Oximetry   














  08/24/19 08/24/19 08/24/19





  21:45 22:00 22:17


 


Temperature   


 


Pulse Rate 113 H 106 H 111 H


 


Respiratory 16 18 13





Rate   


 


Blood Pressure 112/70 106/64 106/64


 


O2 Sat by Pulse 99 100 98





Oximetry   














  08/24/19





  22:30


 


Temperature 


 


Pulse Rate 109 H


 


Respiratory 19





Rate 


 


Blood Pressure 108/64


 


O2 Sat by Pulse 98





Oximetry 











                                   Lab Results











  08/24/19 08/24/19 08/24/19 Range/Units





  21:25 21:25 21:25 


 


WBC  7.6    (4.5-11.0)  K/mm3


 


RBC  4.72    (3.65-5.03)  M/mm3


 


Hgb  13.8    (10.1-14.3)  gm/dl


 


Hct  39.9    (30.3-42.9)  %


 


MCV  85    (79-97)  fl


 


MCH  29    (28-32)  pg


 


MCHC  35 H    (30-34)  %


 


RDW  13.2    (13.2-15.2)  %


 


Plt Count  395    (140-440)  K/mm3


 


PT   13.4   (12.2-14.9)  Sec.


 


INR   1.05   (0.87-1.13)  


 


APTT   27.7   (24.2-36.6)  Sec.


 


Sodium    145  (137-145)  mmol/L


 


Potassium    4.0  (3.6-5.0)  mmol/L


 


Chloride    105.8  ()  mmol/L


 


Carbon Dioxide    22  (22-30)  mmol/L


 


Anion Gap    21  mmol/L


 


BUN    6 L  (7-17)  mg/dL


 


Creatinine    0.6 L  (0.7-1.2)  mg/dL


 


Estimated GFR    > 60  ml/min


 


BUN/Creatinine Ratio    10  %


 


Glucose    109 H  ()  mg/dL


 


Calcium    9.3  (8.4-10.2)  mg/dL


 


Magnesium     (1.7-2.3)  mg/dL


 


Total Bilirubin    0.20  (0.1-1.2)  mg/dL


 


AST    70 H  (5-40)  units/L


 


ALT    109 H  (7-56)  units/L


 


Alkaline Phosphatase    72  ()  units/L


 


Total Creatine Kinase     ()  units/L


 


Total Protein    8.6 H  (6.3-8.2)  g/dL


 


Albumin    4.7  (3.9-5)  g/dL


 


Albumin/Globulin Ratio    1.2  %


 


Acetaminophen     (10.0-30.0)  ug/mL


 


Plasma/Serum Alcohol     (0-0.07)  %














  08/24/19 08/24/19 08/24/19 Range/Units





  21:25 21:25 21:25 


 


WBC  7.6    (4.5-11.0)  K/mm3


 


RBC  4.71    (3.65-5.03)  M/mm3


 


Hgb  13.8    (10.1-14.3)  gm/dl


 


Hct  40.2    (30.3-42.9)  %


 


MCV  85    (79-97)  fl


 


MCH  29    (28-32)  pg


 


MCHC  34    (30-34)  %


 


RDW  13.2    (13.2-15.2)  %


 


Plt Count  396    (140-440)  K/mm3


 


PT   13.3   (12.2-14.9)  Sec.


 


INR   1.04   (0.87-1.13)  


 


APTT     (24.2-36.6)  Sec.


 


Sodium     (137-145)  mmol/L


 


Potassium     (3.6-5.0)  mmol/L


 


Chloride     ()  mmol/L


 


Carbon Dioxide     (22-30)  mmol/L


 


Anion Gap     mmol/L


 


BUN     (7-17)  mg/dL


 


Creatinine     (0.7-1.2)  mg/dL


 


Estimated GFR     ml/min


 


BUN/Creatinine Ratio     %


 


Glucose     ()  mg/dL


 


Calcium     (8.4-10.2)  mg/dL


 


Magnesium    2.20  (1.7-2.3)  mg/dL


 


Total Bilirubin     (0.1-1.2)  mg/dL


 


AST     (5-40)  units/L


 


ALT     (7-56)  units/L


 


Alkaline Phosphatase     ()  units/L


 


Total Creatine Kinase    166 H  ()  units/L


 


Total Protein     (6.3-8.2)  g/dL


 


Albumin     (3.9-5)  g/dL


 


Albumin/Globulin Ratio     %


 


Acetaminophen     (10.0-30.0)  ug/mL


 


Plasma/Serum Alcohol     (0-0.07)  %














  08/24/19 08/24/19 Range/Units





  21:25 21:25 


 


WBC    (4.5-11.0)  K/mm3


 


RBC    (3.65-5.03)  M/mm3


 


Hgb    (10.1-14.3)  gm/dl


 


Hct    (30.3-42.9)  %


 


MCV    (79-97)  fl


 


MCH    (28-32)  pg


 


MCHC    (30-34)  %


 


RDW    (13.2-15.2)  %


 


Plt Count    (140-440)  K/mm3


 


PT    (12.2-14.9)  Sec.


 


INR    (0.87-1.13)  


 


APTT    (24.2-36.6)  Sec.


 


Sodium    (137-145)  mmol/L


 


Potassium    (3.6-5.0)  mmol/L


 


Chloride    ()  mmol/L


 


Carbon Dioxide    (22-30)  mmol/L


 


Anion Gap    mmol/L


 


BUN    (7-17)  mg/dL


 


Creatinine    (0.7-1.2)  mg/dL


 


Estimated GFR    ml/min


 


BUN/Creatinine Ratio    %


 


Glucose    ()  mg/dL


 


Calcium    (8.4-10.2)  mg/dL


 


Magnesium    (1.7-2.3)  mg/dL


 


Total Bilirubin    (0.1-1.2)  mg/dL


 


AST    (5-40)  units/L


 


ALT    (7-56)  units/L


 


Alkaline Phosphatase    ()  units/L


 


Total Creatine Kinase    ()  units/L


 


Total Protein    (6.3-8.2)  g/dL


 


Albumin    (3.9-5)  g/dL


 


Albumin/Globulin Ratio    %


 


Acetaminophen  < 5.0 L   (10.0-30.0)  ug/mL


 


Plasma/Serum Alcohol   0.18 H  (0-0.07)  %














- EKG Data


-: EKG Interpreted by Me


EKG shows normal: sinus rhythm


Rate: tachycardia





- EKG Data


When compared to previous EKG there are: previous EKG unavailable





08/24/19 21:57


Sinus tachycardia, 106 bpm, QTC prolonged, normal axis, otherwise unremarkable 

EKG, there is no prior for comparison, the EKG is not consistent with ST 

elevation myocardial infarction.





- Radiology Data


Radiology results: pending, report reviewed, image reviewed


interpreted by me: 





X-ray of the chest is negative for acute disease.





Noncontrast CT scan of the brain is negative for acute disease.





X-ray of the cervical spine is negative for acute disease.





xr chest negative








ct head, negative


Critical care attestation.: 


If time is entered above; I have spent that time in minutes in the direct care 

of this critically ill patient, excluding procedure time.








ED Disposition


Clinical Impression: 


 Ingestion of caustic substance, Suicidal behavior with attempted self-injury





Disposition: DC-09 OP ADMIT IP TO THIS HOSP


Is pt being admited?: Yes


Condition: Serious Elaine WHITE

## 2021-12-07 NOTE — PROGRESS NOTE ADULT - ASSESSMENT
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    A/P   # behavioral changes r/o seizure /VEEG  -neurology consulted, f/u recommendations after VEEG ( still in progress)   -cbc, cmp, mg, B12 , RPR, folate, TSH, UA, ammonia level noted  - pt was started on B12 injections today   - seizure precautions   - keep Mg above 2.0     #HTN  - DASH diet   - not on medications   - may start Lisinopril 5 mg Q 24 hoursd     #Bilateral lower  extremity edema  -venous dupplex is negative for DVT   - edema resolved   -ECHO    #CLL s/p chemo years ago in remission   -follow up with hem/onc as out    # Leukocytosis   - monitor WBC, likely due to CLL   - no sings of active infection     #Prostate Ca  - in remission   -oupt follow up with urology     DVT prophylaxis- lovenox 40 daily   PT consulted     # Dispo: anticipate discharge in 24 hours

## 2021-12-07 NOTE — PHYSICAL THERAPY INITIAL EVALUATION ADULT - GAIT DEVIATIONS NOTED, PT EVAL
forward flexed posture, unable to correct with cuing./decreased beni/decreased step length/decreased stride length

## 2021-12-08 ENCOUNTER — TRANSCRIPTION ENCOUNTER (OUTPATIENT)
Age: 86
End: 2021-12-08

## 2021-12-08 VITALS
HEART RATE: 99 BPM | SYSTOLIC BLOOD PRESSURE: 106 MMHG | RESPIRATION RATE: 16 BRPM | TEMPERATURE: 98 F | DIASTOLIC BLOOD PRESSURE: 53 MMHG

## 2021-12-08 LAB
ALBUMIN SERPL ELPH-MCNC: 3.2 G/DL — LOW (ref 3.5–5.2)
ALP SERPL-CCNC: 108 U/L — SIGNIFICANT CHANGE UP (ref 30–115)
ALT FLD-CCNC: 15 U/L — SIGNIFICANT CHANGE UP (ref 0–41)
ANION GAP SERPL CALC-SCNC: 8 MMOL/L — SIGNIFICANT CHANGE UP (ref 7–14)
AST SERPL-CCNC: 17 U/L — SIGNIFICANT CHANGE UP (ref 0–41)
BILIRUB SERPL-MCNC: 0.8 MG/DL — SIGNIFICANT CHANGE UP (ref 0.2–1.2)
BUN SERPL-MCNC: 17 MG/DL — SIGNIFICANT CHANGE UP (ref 10–20)
CALCIUM SERPL-MCNC: 8.8 MG/DL — SIGNIFICANT CHANGE UP (ref 8.5–10.1)
CHLORIDE SERPL-SCNC: 105 MMOL/L — SIGNIFICANT CHANGE UP (ref 98–110)
CO2 SERPL-SCNC: 29 MMOL/L — SIGNIFICANT CHANGE UP (ref 17–32)
CREAT SERPL-MCNC: 0.7 MG/DL — SIGNIFICANT CHANGE UP (ref 0.7–1.5)
GLUCOSE SERPL-MCNC: 93 MG/DL — SIGNIFICANT CHANGE UP (ref 70–99)
HCT VFR BLD CALC: 38.3 % — LOW (ref 42–52)
HGB BLD-MCNC: 12.8 G/DL — LOW (ref 14–18)
MAGNESIUM SERPL-MCNC: 2 MG/DL — SIGNIFICANT CHANGE UP (ref 1.8–2.4)
MCHC RBC-ENTMCNC: 33.4 G/DL — SIGNIFICANT CHANGE UP (ref 32–37)
MCHC RBC-ENTMCNC: 34.1 PG — HIGH (ref 27–31)
MCV RBC AUTO: 102.1 FL — HIGH (ref 80–94)
NRBC # BLD: 0 /100 WBCS — SIGNIFICANT CHANGE UP (ref 0–0)
PLATELET # BLD AUTO: 163 K/UL — SIGNIFICANT CHANGE UP (ref 130–400)
POTASSIUM SERPL-MCNC: 4 MMOL/L — SIGNIFICANT CHANGE UP (ref 3.5–5)
POTASSIUM SERPL-SCNC: 4 MMOL/L — SIGNIFICANT CHANGE UP (ref 3.5–5)
PROT SERPL-MCNC: 4.7 G/DL — LOW (ref 6–8)
RBC # BLD: 3.75 M/UL — LOW (ref 4.7–6.1)
RBC # FLD: 13.2 % — SIGNIFICANT CHANGE UP (ref 11.5–14.5)
SODIUM SERPL-SCNC: 142 MMOL/L — SIGNIFICANT CHANGE UP (ref 135–146)
WBC # BLD: 11.93 K/UL — HIGH (ref 4.8–10.8)
WBC # FLD AUTO: 11.93 K/UL — HIGH (ref 4.8–10.8)

## 2021-12-08 PROCEDURE — 99231 SBSQ HOSP IP/OBS SF/LOW 25: CPT

## 2021-12-08 PROCEDURE — 93306 TTE W/DOPPLER COMPLETE: CPT | Mod: 26

## 2021-12-08 PROCEDURE — 99233 SBSQ HOSP IP/OBS HIGH 50: CPT

## 2021-12-08 PROCEDURE — 95720 EEG PHY/QHP EA INCR W/VEEG: CPT

## 2021-12-08 RX ORDER — DIVALPROEX SODIUM 500 MG/1
500 TABLET, DELAYED RELEASE ORAL AT BEDTIME
Refills: 0 | Status: DISCONTINUED | OUTPATIENT
Start: 2021-12-08 | End: 2021-12-08

## 2021-12-08 RX ORDER — DIVALPROEX SODIUM 500 MG/1
1 TABLET, DELAYED RELEASE ORAL
Qty: 240 | Refills: 0
Start: 2021-12-08 | End: 2022-01-06

## 2021-12-08 RX ORDER — PREGABALIN 225 MG/1
1 CAPSULE ORAL
Qty: 30 | Refills: 0
Start: 2021-12-08 | End: 2022-01-06

## 2021-12-08 RX ORDER — LISINOPRIL 2.5 MG/1
5 TABLET ORAL DAILY
Refills: 0 | Status: DISCONTINUED | OUTPATIENT
Start: 2021-12-08 | End: 2021-12-08

## 2021-12-08 RX ORDER — DIVALPROEX SODIUM 500 MG/1
250 TABLET, DELAYED RELEASE ORAL EVERY 24 HOURS
Refills: 0 | Status: DISCONTINUED | OUTPATIENT
Start: 2021-12-09 | End: 2021-12-08

## 2021-12-08 RX ORDER — DIVALPROEX SODIUM 500 MG/1
2 TABLET, DELAYED RELEASE ORAL
Qty: 0 | Refills: 0 | DISCHARGE
Start: 2021-12-08

## 2021-12-08 RX ORDER — LISINOPRIL 2.5 MG/1
1 TABLET ORAL
Qty: 30 | Refills: 0
Start: 2021-12-08

## 2021-12-08 RX ADMIN — ENOXAPARIN SODIUM 40 MILLIGRAM(S): 100 INJECTION SUBCUTANEOUS at 11:03

## 2021-12-08 RX ADMIN — DIVALPROEX SODIUM 250 MILLIGRAM(S): 500 TABLET, DELAYED RELEASE ORAL at 10:55

## 2021-12-08 RX ADMIN — PREGABALIN 1000 MICROGRAM(S): 225 CAPSULE ORAL at 11:03

## 2021-12-08 NOTE — DISCHARGE NOTE PROVIDER - CARE PROVIDER_API CALL
Anselmo Ward)  EEGEpilepsy; Neurology  [facility], [address]  [place] [address]  Phone: [phone]  Fax: [phone]  Follow Up Time: 1 week    Carlee Thompson  INTERNAL MEDICINE  [address] [place] [address]  Phone: [phone]  Fax: [phone]  Follow Up Time: 1 week

## 2021-12-08 NOTE — PROGRESS NOTE ADULT - SUBJECTIVE AND OBJECTIVE BOX
Epilepsy Team Discharge Note:    VEEG monitoring completed, patient is cleared for discharge from neurology standpoint.    Follow up neurology appointment is scheduled with Dr. Ricky Arana  for February 2, 2022 at 11 am.    [address] [place] [address], [address]  [phone]    Discharge seizure medications are:  Depakote 500mg q12hrs (started this admission)    Rx sent to the pharmacy.    Patient was also given Rx for CBC, CMP, VPA level trough to be done prior to follow up.    Detailed instructions regarding follow up plan are given to the patient's wife Moriah, all questions answered.     Discussed with medical and nursing teams.   Epilepsy Team Discharge Note:    VEEG monitoring completed, patient is cleared for discharge from neurology standpoint.    Follow up neurology appointment is scheduled with Dr. Ricky Arana  for February 2, 2022 at 11 am.    [address] [place] [address], [address]  [phone]    Discharge seizure medications are:  Depakote 250mg in AM & 500mg in HS x 2 weeks, then increase to 500mg q12hrs (started this admission)    Rx sent to the pharmacy.    Patient was also given Rx for CBC, CMP, VPA level trough to be done prior to follow up.    Detailed instructions regarding follow up plan are given to the patient's wife Moriah, all questions answered.     Discussed with medical and nursing teams.   Epilepsy Team Discharge Note:    VEEG monitoring completed, patient is cleared for discharge from neurology standpoint.    Follow up neurology appointment is scheduled with Dr. Ricky Arana  for February 2, 2022 at 11 am.    [address] [place] [address], [address]  [phone]    Discharge seizure medications are:  Depakote 250mg in AM & 500mg in HS x 2 weeks, then increase to 500mg q12hrs (started this admission)    Rx sent to the pharmacy.    Patient was also given Rx for CBC, CMP, VPA level trough to be done prior to follow up.    Follow up with PMD in 1-2 weeks to follow up b12 level (supplementation started this admission).    Detailed instructions regarding follow up plan are given to the patient's wife Moriah, all questions answered.     Discussed with medical and nursing teams.

## 2021-12-08 NOTE — DISCHARGE NOTE PROVIDER - HOSPITAL COURSE
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 Pt unable to provide any history. Spoke to wife reports pt has been falling or acting like falling frequently last on Monday. Pt hit head, wife denies LOC. Wife denies pt having hx of seizure. Wife denies fever, chills, chest pain, burning with urination, diarrhea.      Pt was evaluated with VEEG. Pt to be titrated up to Depakote 500mg Q12.     Pt was cleared for DC from Neurology. He should fup as OP.    Pt had some HTN. Started on Lisinopril 5mg daily with better control.

## 2021-12-08 NOTE — DISCHARGE NOTE NURSING/CASE MANAGEMENT/SOCIAL WORK - PATIENT PORTAL LINK FT
You can access the FollowMyHealth Patient Portal offered by [facility] by registering at the following website: [email or web address]. By joining [employer]’s FollowMyHealth portal, you will also be able to view your health information using other applications (apps) compatible with our system.

## 2021-12-08 NOTE — PROGRESS NOTE ADULT - SUBJECTIVE AND OBJECTIVE BOX
Patient is a 89y old  Male who presents with suspected seizure, on VEEG now. Pt is underweight, deconditioned, weak, has no specific complaints.  Pt was consulted by neurology, VEEG was significant for abnormal findings.   Today pt is comfortable, has no specific complaints, has good appetitive.     Vital Signs Last 24 Hrs  T(C): 35.9 (08 Dec 2021 05:01), Max: 36.1 (07 Dec 2021 13:05)  T(F): 96.6 (08 Dec 2021 05:01), Max: 97 (07 Dec 2021 13:05)  HR: 52 (08 Dec 2021 05:01) (52 - 86)  BP: 118/55 (08 Dec 2021 05:01) (107/54 - 148/97)  BP(mean): --  RR: 16 (08 Dec 2021 05:01) (16 - 16)      PHYSICAL EXAM:  GENERAL: NAD, well-groomed, well-developed  HEAD:  Atraumatic, Normocephalic  NECK: Supple, No JVD, Normal thyroid  NERVOUS SYSTEM:  awake, answering questions, demented, moving all extremities   CHEST/LUNG: decreased BS at bases   HEART: Regular rate and rhythm; No murmurs, rubs, or gallops  ABDOMEN: Soft, Nontender, Nondistended; Bowel sounds present  EXTREMITIES:  2+ Peripheral Pulses, No clubbing, cyanosis, or edema, muscle waisting noted on LE   LYMPH: No lymphadenopathy noted  SKIN: No rashes or lesions      LABS:                                   12.8   11.93 )-----------( 163      ( 08 Dec 2021 07:43 )             38.3   12-08    142  |  105  |  17  ----------------------------<  93  4.0   |  29  |  0.7    Ca    8.8      08 Dec 2021 07:43  Mg     2.0     12-08    TPro  4.7<L>  /  Alb  3.2<L>  /  TBili  0.8  /  DBili  x   /  AST  17  /  ALT  15  /  AlkPhos  108  12-08      RADIOLOGY & ADDITIONAL TESTS:    < from: VA Duplex Lower Ext Vein Scan, Bilat (12.06.21 @ 13:02) >  Impression:    No evidence of deep venous thrombosis or superficial thrombophlebitis in the bilateral lower extremities.    Right peroneal vein not visualized    < end of copied text >  < from: Xray Chest 1 View-PORTABLE IMMEDIATE (Xray Chest 1 View-PORTABLE IMMEDIATE .) (12.06.21 @ 11:47) >  IMPRESSION:    Right IJ Mediport, unchanged. No focal consolidation, pneumothorax or pleural effusion.    Stable cardiomediastinal silhouette. Unchanged osseous structures.    < end of copied text >  < from: CT Head No Cont (12.05.21 @ 13:50) >    IMPRESSION:    No acute territorial infarct, intracranial hemorrhage, or midline shift.    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    Stable moderate chronic microvascular ischemic changes.          MEDICATIONS  (STANDING):  chlorhexidine 4% Liquid 1 Application(s) Topical <User Schedule>  cyanocobalamin Injectable 1000 MICROGram(s) IntraMuscular daily  diVALproex Sprinkle 250 milliGRAM(s) Oral every 12 hours  enoxaparin Injectable 40 milliGRAM(s) SubCutaneous daily    MEDICATIONS  (PRN):  LORazepam   Injectable 2 milliGRAM(s) IV Push two times a day PRN generalized tonic-clonic seizure lasting longer than 2 minutes

## 2021-12-08 NOTE — DISCHARGE NOTE PROVIDER - NSDCCPCAREPLAN_GEN_ALL_CORE_FT
PRINCIPAL DISCHARGE DIAGNOSIS  Diagnosis: Seizures  Assessment and Plan of Treatment: cont Depakote - as prescribed by Neuro  FUP OP Dr Ricky Norman      SECONDARY DISCHARGE DIAGNOSES  Diagnosis: Mild HTN  Assessment and Plan of Treatment: cont Lisinopril  FUP PMD

## 2021-12-08 NOTE — PROGRESS NOTE ADULT - ASSESSMENT
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    A/P   # behavioral changes r/o seizure /VEEG  -neurology consulted, f/u recommendations   -  VEEG ( still in progress), was significant for seizure activity   -cbc, cmp, mg, B12 , RPR, folate, TSH, UA, ammonia level noted  - pt was started on B12 injections   - seizure precautions   - keep Mg above 2.0     #HTN  - DASH diet   - not on medications   -  Lisinopril 5 mg Q 24 hours     #Bilateral lower  extremity edema  -venous dupplex is negative for DVT   - edema resolved   -ECHO    #CLL s/p chemo years ago in remission   -follow up with hem/onc as out    # Leukocytosis   - resolving, likely reactive   - monitor WBC, likely due to CLL   - no sings of active infection     #Prostate Ca  - in remission   -oupt follow up with urology     DVT prophylaxis- lovenox 40 daily   PT consulted     # Dispo: anticipate discharge when cleared by neuro , pt needs PT

## 2021-12-08 NOTE — PROGRESS NOTE ADULT - SUBJECTIVE AND OBJECTIVE BOX
Epilepsy Attending Note:     KO MEJÍA    89y Male  MRN [id number]    Vital Signs Last 24 Hrs  T(C): 35.9 (08 Dec 2021 05:01), Max: 36.1 (07 Dec 2021 13:05)  T(F): 96.6 (08 Dec 2021 05:01), Max: 97 (07 Dec 2021 13:05)  HR: 52 (08 Dec 2021 05:01) (52 - 86)  BP: 118/55 (08 Dec 2021 05:01) (107/54 - 148/97)  BP(mean): --  RR: 16 (08 Dec 2021 05:01) (16 - 16)  SpO2: --                          12.8   11.93 )-----------( 163      ( 08 Dec 2021 07:43 )             38.3       12-08    142  |  105  |  17  ----------------------------<  93  4.0   |  29  |  0.7    Ca    8.8      08 Dec 2021 07:43  Mg     2.0     12-    TPro  4.7<L>  /  Alb  3.2<L>  /  TBili  0.8  /  DBili  x   /  AST  17  /  ALT  15  /  AlkPhos  108  12-08      MEDICATIONS  (STANDING):  chlorhexidine 4% Liquid 1 Application(s) Topical <User Schedule>  cyanocobalamin Injectable 1000 MICROGram(s) IntraMuscular daily  diVALproex Sprinkle 250 milliGRAM(s) Oral every 12 hours  enoxaparin Injectable 40 milliGRAM(s) SubCutaneous daily    MEDICATIONS  (PRN):  LORazepam   Injectable 2 milliGRAM(s) IV Push two times a day PRN generalized tonic-clonic seizure lasting longer than 2 minutes        VEEG in the last 24 hours:    Background - symmetrical, slightly less than optimally organized, reaching frequencies in the range of 7-8 Hz superimposed by small amount of lower amplitude theta    Focal and generalized slowin. mild to moderate generalized slowing  2. mild to moderate left hemispheric, mainly posterior and posterior temporal focal slowing    Interictal activity - mid to posterior temporal triphasic sharps and sharp waves that are probably epileptiform in nature    Events - none    Seizures - none    Impression: Abnormal VEEG as kalpesh    Plan -      Epilepsy Attending Note:     KO MEJÍA    89y Male  MRN [id number]    Vital Signs Last 24 Hrs  T(C): 35.9 (08 Dec 2021 05:01), Max: 36.1 (07 Dec 2021 13:05)  T(F): 96.6 (08 Dec 2021 05:01), Max: 97 (07 Dec 2021 13:05)  HR: 52 (08 Dec 2021 05:01) (52 - 86)  BP: 118/55 (08 Dec 2021 05:01) (107/54 - 148/97)  BP(mean): --  RR: 16 (08 Dec 2021 05:01) (16 - 16)  SpO2: --                          12.8   11.93 )-----------( 163      ( 08 Dec 2021 07:43 )             38.3       12-    142  |  105  |  17  ----------------------------<  93  4.0   |  29  |  0.7    Ca    8.8      08 Dec 2021 07:43  Mg     2.0         TPro  4.7<L>  /  Alb  3.2<L>  /  TBili  0.8  /  DBili  x   /  AST  17  /  ALT  15  /  AlkPhos  108  12-08      MEDICATIONS  (STANDING):  chlorhexidine 4% Liquid 1 Application(s) Topical <User Schedule>  cyanocobalamin Injectable 1000 MICROGram(s) IntraMuscular daily  diVALproex Sprinkle 250 milliGRAM(s) Oral every 12 hours  enoxaparin Injectable 40 milliGRAM(s) SubCutaneous daily    MEDICATIONS  (PRN):  LORazepam   Injectable 2 milliGRAM(s) IV Push two times a day PRN generalized tonic-clonic seizure lasting longer than 2 minutes        VEEG in the last 24 hours:    Background - symmetrical, slightly less than optimally organized, reaching frequencies in the range of 7-8 Hz superimposed by small amount of lower amplitude theta    Focal and generalized slowin. mild to moderate generalized slowing  2. mild to moderate left hemispheric, mainly posterior and posterior temporal focal slowing    Interictal activity - mid to posterior temporal triphasic sharps and sharp waves that are probably epileptiform in nature    Events - none    Seizures - none    Impression: Abnormal VEEG as kalpesh    Plan -   D/c VEEG  Titrate VPA gradually to reach 500mg q12hrs  Continue vitamin B12 supplementation  Follow up with Dr. Hugo in 6-8 weeks  Follow up with C[facility]   Epilepsy Attending Note:     KO MEJÍA    89y Male  MRN [id number]    Vital Signs Last 24 Hrs  T(C): 35.9 (08 Dec 2021 05:01), Max: 36.1 (07 Dec 2021 13:05)  T(F): 96.6 (08 Dec 2021 05:01), Max: 97 (07 Dec 2021 13:05)  HR: 52 (08 Dec 2021 05:01) (52 - 86)  BP: 118/55 (08 Dec 2021 05:01) (107/54 - 148/97)  BP(mean): --  RR: 16 (08 Dec 2021 05:01) (16 - 16)  SpO2: --                          12.8   11.93 )-----------( 163      ( 08 Dec 2021 07:43 )             38.3       12-    142  |  105  |  17  ----------------------------<  93  4.0   |  29  |  0.7    Ca    8.8      08 Dec 2021 07:43  Mg     2.0         TPro  4.7<L>  /  Alb  3.2<L>  /  TBili  0.8  /  DBili  x   /  AST  17  /  ALT  15  /  AlkPhos  108  12-08      MEDICATIONS  (STANDING):  chlorhexidine 4% Liquid 1 Application(s) Topical <User Schedule>  cyanocobalamin Injectable 1000 MICROGram(s) IntraMuscular daily  diVALproex Sprinkle 250 milliGRAM(s) Oral every 12 hours  enoxaparin Injectable 40 milliGRAM(s) SubCutaneous daily    MEDICATIONS  (PRN):  LORazepam   Injectable 2 milliGRAM(s) IV Push two times a day PRN generalized tonic-clonic seizure lasting longer than 2 minutes        VEEG in the last 24 hours:    Background - symmetrical, slightly less than optimally organized, reaching frequencies in the range of 7-8 Hz superimposed by small amount of lower amplitude theta    Focal and generalized slowin. mild to moderate generalized slowing  2. mild to moderate left hemispheric, mainly posterior and posterior temporal focal slowing    Interictal activity - mid to posterior temporal triphasic sharps and sharp waves that are probably epileptiform in nature    Events - none    Seizures - none    Impression: Abnormal VEEG as above    Plan -   D/c VEEG  Titrate VPA gradually to reach 500mg q12hrs  Continue vitamin B12 supplementation  Follow up with Dr. Hugo in 6-8 weeks  Follow up with [facility]

## 2021-12-08 NOTE — CHART NOTE - NSCHARTNOTEFT_GEN_A_CORE
Asked by Dr Jones to DC pt - cleared from Neuro standpoint  Rx sent to pharm by Epilepsy team    Lisinopril Rx sent by me

## 2021-12-08 NOTE — DISCHARGE NOTE PROVIDER - NSDCMRMEDTOKEN_GEN_ALL_CORE_FT
divalproex sodium 125 mg oral delayed release capsule: 2 cap(s) orally every 12 hours  lisinopril 5 mg oral tablet: 1 tab(s) orally once a day   divalproex sodium 125 mg oral delayed release capsule: 2 cap(s) orally every 12 hours  lisinopril 5 mg oral tablet: 1 tab(s) orally once a day  Vitamin B12 1000 mcg oral tablet: 1 tab(s) orally once a day

## 2021-12-08 NOTE — DISCHARGE NOTE NURSING/CASE MANAGEMENT/SOCIAL WORK - NSDCPEFALRISK_GEN_ALL_CORE
For information on Fall & Injury Prevention, visit: [email or web address] OR  [email or web address] OR  https://www.cdc.gov/steadi/patient.html

## 2021-12-08 NOTE — DISCHARGE NOTE PROVIDER - PROVIDER TOKENS
PROVIDER:[TOKEN:[[id number]],FOLLOWUP:[1 week]],PROVIDER:[TOKEN:[[id number]],FOLLOWUP:[1 week]]

## 2021-12-08 NOTE — DISCHARGE NOTE PROVIDER - CARE PROVIDERS DIRECT ADDRESSES
[email or web address]

## 2021-12-08 NOTE — PROGRESS NOTE ADULT - TIME BILLING
direct pt's care. communication with medical and neurology team
direct pt's care. communication with medical and neurology team

## 2021-12-17 NOTE — CDI QUERY NOTE - NSCDIOTHERTXTBX_GEN_ALL_CORE_HH
Documentation:  **  PN Neurology: VEEG in the last 24 hours:        Background - symmetrical, slightly less than optimally organized, reaching frequencies in the range of 7-8 Hz superimposed by small amount of lower amplitude theta       Focal and generalized slowin. mild to moderate generalized slowing          2. mild to moderate left hemispheric, mainly posterior and posterior temporal focal slowing.      Interictal activity - mid to posterior temporal triphasic sharps and sharp waves that are probably epileptiform in nature    **  Discharge Summary: A 89 y/o male with pmhx of HTN not on meds, C........., Intracerebral hemorrhage left parieto-occipital lobe 5 yrs ago  PRINCIPAL DISCHARGE DIAGNOSIS: Seizures                                           Query:  Based on your clinical judgment and consideration of these clinical indicators, please clarify if seizure can be further specified as:  • Seizure related to previous intracerebral hemorrhage can not be ruled out.  • Seizure is unrelated to previous intracerebral hemorrhage	  • Other (please specify).  • Unable to further specify seizure

## 2022-02-02 ENCOUNTER — NON-APPOINTMENT (OUTPATIENT)
Age: 87
End: 2022-02-02

## 2022-02-02 ENCOUNTER — APPOINTMENT (OUTPATIENT)
Dept: NEUROLOGY | Facility: CLINIC | Age: 87
End: 2022-02-02

## 2024-03-18 NOTE — ASU PATIENT PROFILE, ADULT - PT NEEDS ASSIST
Ortiz Hoffman  Cardiovascular Disease  [address] [place] [address]  Phone: [phone]  Fax: [phone]  Follow Up Time: 1 week    Dinora Josue  Neurosurgery  [address]  [place] [address]  Phone: [phone]  Fax: [phone]  Established Patient  Follow Up Time:    no

## 2024-06-10 NOTE — ASU PREOP CHECKLIST - DENTURES
You were treated today for a laceration. Your physical exam is reassuring. Your laceration was closed with a skin glue. Please do not apply any oil-based ointment, as this will make the glue breakdown early. Please do not soak your hand in water, as this will also cause the glue to breakdown and early. Over time, the glue will begin to break down and will come off on its own. The glue is water resistant, so you can wash it with soap and water. Showering is OK.     Once the glue starts to come loose, you may put Vaseline on it and leave it in place for a while to help it breakdown before gently brushing out of the hair    Please come back to the ED if you develop fever, chills, increased redness around the wound, drainage from wound or for any other concerns.     
no